# Patient Record
(demographics unavailable — no encounter records)

---

## 2025-05-24 NOTE — HISTORY OF PRESENT ILLNESS
[FreeTextEntry1] : Jw Arboleda is a 60-year-old man PMH AFib with RVR, history of ablation in 2016, rotator cuff repair who presents today for initial evaluation. Patient reports he recently had a repeat ablation last month and reverted back into afib one month later. Was placed on amiodarone but remains in afib. He reports strenuous exercise is difficult while he is in afib. He is maintained on Xarelto w/o s/s of bleeding. Denies chest pain, palpitations, SOB at rest, syncope or near syncope.

## 2025-05-24 NOTE — DISCUSSION/SUMMARY
[EKG obtained to assist in diagnosis and management of assessed problem(s)] : EKG obtained to assist in diagnosis and management of assessed problem(s) [FreeTextEntry1] : Impression:  1. AF:  EKG performed today to assess for presence of conduction disease and reveals atrial fibrillation at 86bpm. He remains on Xarelto and amiodarone. Discussed treatment options for afib including rate control vs antiarrhythmics vs possible ablation vs DCCV. Given recurrent symptomatic afib despite rate control management and preference to avoid antiarrhythmics, recommend undergoing possible afib ablation. Recommend cardioverting patient and then proceeding with ablation using Affera. Will keep patient on amiodarone through this period until post ablation. Risks, benefits, and alternatives to procedure discussed at length. Risks including that of bleeding, infection, stroke, and cardiac tamponade discussed and he verbalizes understanding of all. Will hold all medications the morning of the ablation. May take all regularly scheduled medications the night before.  2. Amiodarone: if continues on amiodarone, will need periodic TFT, LFT, PFT and ocular exams given risk of toxicity with the drug.